# Patient Record
(demographics unavailable — no encounter records)

---

## 2024-11-27 NOTE — REVIEW OF SYSTEMS
[Shortness Of Breath] : shortness of breath [Cough] : cough [As Noted in HPI] : as noted in HPI [Emotional Problems] : emotional problems [Negative] : Heme/Lymph [FreeTextEntry6] : Has had a cough and Shortness of breath for several weeks gone to urgent care. Pt attributes to smoking cigarettes and marijuana [de-identified] : Takes Adderall for ADHD

## 2024-11-27 NOTE — PHYSICAL EXAM
[No Rash or Lesion] : No rash or lesion [Alert] : alert [Oriented to Person] : oriented to person [Oriented to Place] : oriented to place [Oriented to Time] : oriented to time [Calm] : calm [de-identified] : Normal [de-identified] : Pilonidal cyst w/ 4-5 pits; mild infection; no abscess [de-identified] : Normal [de-identified] : Normal [de-identified] : Normal [de-identified] : Normal [de-identified] : Normal

## 2024-11-27 NOTE — ASSESSMENT
[FreeTextEntry1] : 21 M w/ pilonidal cyst; currently with mild infection; no abscess. Plan: Antibiotics. Shave the hair around the cyst. Will schedule surgery once infection resolves. All questions answered.

## 2024-11-27 NOTE — HISTORY OF PRESENT ILLNESS
[FreeTextEntry1] : 21-year-old male pt last seen 1/2/24 for pilonidal cyst. At that time pt was going to call to schedule surgery during school break.  Pt here for assessment of his pilonidal cyst  Had Botox surgery in his neck over the summer to relax a muscle in his neck to allow him to burp. Due to that surgery, he put the pilonidal surgery on hold. Pilonidal area is very itchy, and annoying and makes it hard to completely evacuate his stool. His stool comes out in tiny slivers Denies bleeding Notices green- black discharge, and the surrounding skin is hot Reports chills deny fevers Not doing any treatment right now for the pilonidal cyst.  Pt looking to do looking do surgery 1/5- 1/17